# Patient Record
Sex: FEMALE | Race: WHITE | NOT HISPANIC OR LATINO | Employment: FULL TIME | URBAN - METROPOLITAN AREA
[De-identification: names, ages, dates, MRNs, and addresses within clinical notes are randomized per-mention and may not be internally consistent; named-entity substitution may affect disease eponyms.]

---

## 2017-09-20 DIAGNOSIS — O35.9XX1 FETAL ABNORMALITY AFFECTING MANAGEMENT OF MOTHER, FETUS 1: Primary | ICD-10-CM

## 2017-10-04 ENCOUNTER — OFFICE VISIT (OUTPATIENT)
Dept: NEUROSURGERY | Facility: CLINIC | Age: 32
End: 2017-10-04
Payer: COMMERCIAL

## 2017-10-04 ENCOUNTER — OFFICE VISIT (OUTPATIENT)
Dept: MATERNAL FETAL MEDICINE | Facility: CLINIC | Age: 32
End: 2017-10-04
Attending: OBSTETRICS & GYNECOLOGY
Payer: COMMERCIAL

## 2017-10-04 ENCOUNTER — HOSPITAL ENCOUNTER (OUTPATIENT)
Dept: RADIOLOGY | Facility: HOSPITAL | Age: 32
Discharge: HOME | End: 2017-10-04
Attending: OBSTETRICS & GYNECOLOGY
Payer: COMMERCIAL

## 2017-10-04 VITALS
WEIGHT: 168.38 LBS | TEMPERATURE: 98 F | HEART RATE: 91 BPM | SYSTOLIC BLOOD PRESSURE: 111 MMHG | HEIGHT: 67 IN | DIASTOLIC BLOOD PRESSURE: 70 MMHG | BODY MASS INDEX: 26.43 KG/M2

## 2017-10-04 VITALS
BODY MASS INDEX: 26.38 KG/M2 | DIASTOLIC BLOOD PRESSURE: 70 MMHG | SYSTOLIC BLOOD PRESSURE: 111 MMHG | WEIGHT: 168.44 LBS

## 2017-10-04 DIAGNOSIS — O35.09X0 FETAL DANDY-WALKER MALFORMATION AFFECTING CARE OF MOTHER IN SINGLETON PREGNANCY, ANTEPARTUM: Primary | ICD-10-CM

## 2017-10-04 DIAGNOSIS — O35.9XX1 FETAL ABNORMALITY AFFECTING MANAGEMENT OF MOTHER, FETUS 1: ICD-10-CM

## 2017-10-04 DIAGNOSIS — O35.10X0 CHROMOSOMAL ABNORMALITY IN FETUS, AFFECTING MANAGEMENT OF MOTHER, ANTEPARTUM, SINGLE OR UNSPECIFIED FETUS: Primary | ICD-10-CM

## 2017-10-04 PROCEDURE — 99204 OFFICE O/P NEW MOD 45 MIN: CPT | Mod: S$GLB,,, | Performed by: NEUROLOGICAL SURGERY

## 2017-10-04 PROCEDURE — 74712 MRI FETAL SNGL/1ST GESTATION: CPT | Mod: TC

## 2017-10-04 PROCEDURE — 99243 OFF/OP CNSLTJ NEW/EST LOW 30: CPT | Mod: 25,S$GLB,, | Performed by: OBSTETRICS & GYNECOLOGY

## 2017-10-04 PROCEDURE — 76811 OB US DETAILED SNGL FETUS: CPT | Mod: S$GLB,,, | Performed by: OBSTETRICS & GYNECOLOGY

## 2017-10-04 PROCEDURE — 99999 PR PBB SHADOW E&M-EST. PATIENT-LVL III: CPT | Mod: PBBFAC,,, | Performed by: NEUROLOGICAL SURGERY

## 2017-10-04 PROCEDURE — 74712 MRI FETAL SNGL/1ST GESTATION: CPT | Mod: 26,,, | Performed by: RADIOLOGY

## 2017-10-04 PROCEDURE — 99999 PR PBB SHADOW E&M-EST. PATIENT-LVL II: CPT | Mod: PBBFAC,,, | Performed by: OBSTETRICS & GYNECOLOGY

## 2017-10-04 NOTE — LETTER
October 6, 2017      Luiz Sears MD  [address] [facility]  [address] [facility] [address]           [facility]  [address]  [place] [address]  Phone: [phone]          Patient: Amber Aguilar   MR Number: [id number]   YOB: 1985   Date of Visit: 10/4/2017       Dear Dr. Luiz Sears:    Thank you for referring Amber Aguilar to me for evaluation. Attached you will find relevant portions of my assessment and plan of care.    If you have questions, please do not hesitate to call me. I look forward to following Amber Aguilar along with you.    Sincerely,    Robin Hogan MD    Enclosure  CC:  No Recipients    If you would like to receive this communication electronically, please contact [email or web address] or [phone] to request more information on [employer] Link access.    For providers and/or their staff who would like to refer a patient to [facility], please contact us through our one-stop-shop provider referral line, [facility] , at [phone].    If you feel you have received this communication in error or would no longer like to receive these types of communications, please e-mail [email or web address]

## 2017-10-04 NOTE — PROGRESS NOTES
Subjective:    I, Xenia Moore, attest that this documentation has been prepared under the direction and in the presence of TADEO Hogan MD.     Patient ID: Amber Aguilar is a 32 y.o. female.    Chief Complaint: No chief complaint on file.    HPI   Pt is a 32 y.o. female who presents per referral by Dr. Luiz Sears for evaluation of fetal cerebral abnormality. Pt who is 28 gestation with 3rd child. Pt also recently found out that their amniocentesis revealed a chromosomal 6 deletion. Mom denies any family history of congential cerebral abnormalities or neurologic issues with her 2 previous children. Mom has no signs or symptoms of any neurological issues. Non focal.     Review of Systems   Constitutional: Negative for chills, fatigue and fever.   HENT: Negative for sinus pressure and trouble swallowing.    Eyes: Negative.  Negative for visual disturbance.   Respiratory: Negative.    Cardiovascular: Negative.    Gastrointestinal: Negative.  Negative for nausea and vomiting.   Endocrine: Negative.    Genitourinary: Negative.    Musculoskeletal: Negative.    Neurological: Negative for dizziness, seizures, syncope, speech difficulty, weakness and numbness.       Objective:      Physical Exam:  Nursing note and vitals reviewed.    Constitutional: She appears well-developed.     Eyes: Pupils are equal, round, and reactive to light. Conjunctivae and EOM are normal.     Cardiovascular: Normal rate, regular rhythm, normal pulses and intact distal pulses.     Abdominal: Soft.     Psych/Behavior: She is alert. She is oriented to person, place, and time. She has a normal mood and affect.     Musculoskeletal: Gait is normal.        Neck: Range of motion is full. There is no tenderness. Muscle strength is 5/5. Tone is normal.        Back: Range of motion is full. There is no tenderness. Muscle strength is 5/5. Tone is normal.        Right Upper Extremities: Range of motion is full. There is no tenderness. Muscle strength is  5/5. Tone is normal.        Left Upper Extremities: Range of motion is full. There is no tenderness. Muscle strength is 5/5. Tone is normal.       Right Lower Extremities: Range of motion is full. There is no tenderness. Muscle strength is 5/5. Tone is normal.        Left Lower Extremities: Range of motion is full. There is no tenderness. Muscle strength is 5/5. Tone is normal.     Neurological:        Coordination: She has a normal Romberg Test, normal finger to nose coordination, normal heel to shin coordination and normal tandem walking coordination.        DTRs: DTRs are normal. Tricep reflexes are 2+ on the right side and 2+ on the left side. Bicep reflexes are 2+ on the right side and 2+ on the left side. Brachioradialis reflexes are 2+ on the right side and 2+ on the left side. Patellar reflexes are 2+ on the right side and 2+ on the left side. Achilles reflexes are 2+ on the right side and 2+ on the left side.        Cranial nerves: Cranial nerve(s) II, III, IV, V, VI, VII, VIII, IX, X, XI and XII are intact.       Mom has no signs or symptoms of any neurological issues.  Non focal.     Imaging:   I reviewed maternal fetal MRI scan, this has not been read by the radiologist. The resolution is not at the level that I would like, but I am able to see no obvious spinal abnormalities. Intracranially, pt has enlarged ventricles, but its more of a colpocephaly. There is a posterior fossa cyst that looks like it does communicate with the 4th ventricle, but doesn't appear to be grossly enlarged. 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     I, TADEO Hogan MD, personally reviewed the imaging and interpreted  independent of the radiology report.     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 Signs of early hydrocephalous, but I don't see anything that we would intervene at this junction. We would recommend  continuing following pt with US and if the hydrocephalous progresses then we would advocate for C section, but otherwise we will just have to see how the baby does postnatally. We discussed a possible shunt and the implications of this. We discussed the wide range of prognosis at this junction.     I, TADEO Hogan MD, personally performed the services described in this documentation. All medical record entries made by the scribe, Xenia Moore, were at my direction and in my presence.  I have reviewed the chart and agree that the record reflects my personal performance and is accurate and complete.

## 2017-10-06 NOTE — PROGRESS NOTES
"Indication  ========    Consultation. Fetal anatomy survey.    History  ======    Previous Outcomes  Preg. no. 1  Outcome: Live birth  Gender: female  Details:  C/S 8 lb 3 oz  Preg. no. 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 View: Suboptimal view: limited by fetal position.    Pregnancy  =========    Escalante pregnancy. Number of fetuses: 1.    Dating  ======    Cycle: regular cycle  GA by "stated dating" 26 w + 4 d  ANNMARIE by "stated dating": 2018  Ultrasound examination on: 10/4/2017  GA by U/S based upon: AC, BPD, Femur, HC  GA by U/S 26 w + 5 d  ANNMARIE by U/S: 2018  Assigned: Dating performed on 10/4/2017, based on the external assessment  Assigned GA 26 w + 4 d  Assigned ANNMARIE: 2018    General Evaluation  ==============    Cardiac activity: present.  bpm.  Fetal movements: visualized.  Presentation: transverse.  Placenta: posterior.  Umbilical cord: 3 vessel cord, normal.  Amniotic fluid: normal amount.    Fetal Biometry  ============    Fetal Biometry  BPD 66.3 mm 26w 5d Hadlock  OFD 88.7 mm 28w 4d Eleanor  .8 mm 26w 6d Hadlock  .3 mm 27w 3d Hadlock  Femur 46.7 mm 25w 4d Hadlock  Cerebellum tr 29.5 mm 27w 1d Kowalski  CM 12.7 mm  LLV 14.1 mm  RLV 15.9 mm   g 48% Arturo  Calculated by: Hadlock (BPD-HC-AC-FL)  EFW (lb) 2 lb  EFW (oz) 2 oz  Cephalic index 0.75  HC / AC 1.08  FL / BPD 0.70  FL / AC 0.20   bpm  Head / Face / Neck   14.1 mm    Fetal " Anatomy  ===========    Cranium: normal  Lateral ventricles: normal  Choroid plexus: normal  Midline falx: normal  Cavum septi pellucidi: normal  Cerebellum: normal  Cisterna magna: abnormal  Head shape: normal  Rt lateral ventricle: abnormal  Lt lateral ventricle: abnormal  Rt choroid plexus: normal  Lt choroid plexus: normal  Parenchyma: normal  Third ventricle: normal  Fourth ventricle: abnormal  Fourth ventricle: mild dilation  Posterior fossa: normal  Cerebellar lobes: normal  Vermis: abnormal  Vermis: 5.1mm  Neck: appears normal  Lips: suboptimal  Profile: suboptimal  Nose: suboptimal  Maxilla: suboptimal  Mandible: suboptimal  4-chamber view: normal  RVOT: normal  LVOT: normal  Heart / Thorax: Septal views: normal  Situs: normal  Aortic arch: normal  Ductal arch: normal  SVC: normal  IVC: normal  3-vessel view: suboptimal  3-vessel-trachea view: suboptimal  Cardiac position: normal  Cardiac axis: normal  Cardiac size: normal  Cardiac rhythm: normal  Rt lung: normal  Lt lung: normal  Diaphragm: normal  Cord insertion: normal  Stomach: normal  Kidneys: normal  Bladder: normal  Genitals: normal  Abdom. wall: appears normal  Abdom. cavity: normal  Rt kidney: normal  Lt kidney: normal  Liver: normal  Bowel: normal  Cervical spine: normal  Thoracic spine: normal  Lumbar spine: normal  Sacral spine: normal  Arms: normal  Legs: normal  Rt arm: normal  Lt arm: normal  Rt hand: present  Lt hand: present  Rt leg: normal  Lt leg: normal  Rt foot: abnormal  Rt foot: clubbing  Lt foot: normal  Position of hands: normal  Gender: female  Wants to know gender: yes    Maternal Structures  ===============    Uterus / Cervix  Uterus: Normal  Ovaries / Tubes / Adnexa  Rt ovary: Visualized  Lt ovary: Visualized  Other: Uterus and adnexa normal    Consultation  ==========    Consulting physician: Dr. Toni Reid    Reason for consultation: Fetal chromosome abnormality and fetal posterior fossa abnormality for discussion of  delivery planning    Sapna is a 32-year-old para 2002 at 26 weeks and 4 days gestation whom Dr. Reid  saw in consultation from Dr. Miranda due to an abnormal serum screen. 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 Parental testing revealed this to be de lula.    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 She met with Dr. Hogan who counseled her regarding these findings as  well.    We performed a targeted ultrasound evaluation today and the findings are detailed on this report. Notably in assessing the intracranial anatomy  we saw that there was mild lateral ventriculomegaly with measurements ranging from 12-14 mm in maximal dimension. There did appear to be  some colpocephaly with prominence of the posterior horns. The third ventricle appeared dilated and that cavum septum pellucidum appeared  somewhat widened. The fourth ventricle was visible suggesting mild dilation. In the posterior fossa there is a cystic structure posterior to the  cerebellum. The upper portion of the cerebellar vermis appears intact but inferiorly I feel like we can see splaying of the cerebellum with inferior  vermian agenesis suggesting a Dandy-Walker variant. In the lower extremities the right foot appeared clubbed. The left foot appeared normal.  The cardiac anatomy overall appeared normal But views were somewhat limited by the position. Views of the fetal face were limited as the  fetus was in a prone position.    I overall spent approximately 40 minutes in face-to-face time with Sapna and her  greater than 50% of  which were spent in counseling  time regarding today's findings and the micro-array result as well as in discussion of further pregnancy management and plans of care. I  discussed the micro-array result and the significance of a deletion of the terminal portion of chromosome 6. 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 This involves a number of genes that are  developmentally important especially for structures in the face, heart, eyes, brain, and ears. A phenotype for the 6p terminal deletion syndrome  has been described and includes craniofacial anomalies, eye anomalies, hearing loss, brain abnormalities such as Dandy-Walker, foot  anomaly, cognitive impairment and other associated problems. However, the phenotype is highly variable and depends on the size of the  deletion. In this case, the deletion was not visible on karyotype, but only on array, so the phenotype may not be as severe as the deletion is  smaller than that described in the literature with visible deletions.    We discussed the significance of the CNS anomalies that are noted on imaging. I explained that at this point we see mild to moderate  ventriculomegaly but that we cannot predict what will happen over the course of the remainder pregnancy. I suggested to them that as the  pregnancy progresses that this be followed by Dr. Nova and if there gets to be more progressive ventriculomegaly then the likelihood of the  need for decompression in the immediate  period increases. In the literature, many David pouch cysts actually decrease in size as  pregnancy progresses and if that is the case that may further decrease the likelihood of needing decompression. Theyre trying to decide  where to deliver and I explained that ultimately I do not think that she has to deliver here in [place] as I would not anticipate that the infant  will need immediate  neurosurgical intervention, but it is possible that some time in the  period that some type of neurosurgical  intervention could be required. I do think that it would be reasonable for her if she strongly desired to deliver in [place] since they have a   intensive care unit there with the understanding that it is always a possibility that the infant could require transfer to a center with  neurosurgical capabilities.    I explained to them that at this point while we can see the CNS lesions and the clubfoot which is likely related to the degree of neurologic  lesion, the deletion will be more important in the overall prognostic determination for this infant than consideration of the structural problems  alone. Postnatally the infant will need a comprehensive evaluation including an echocardiogram, an ultrasound of the abdomen and pelvis with a  special focus on the kidneys, head MRI, ophthalmology consult, and hearing evaluation. The infant will also need to be seen postnatally by a  pediatric geneticist.    Im going to send Sapna some information regarding the 6p deletion syndrome and I am also going to try to get in contact with a genetic  counselor from [place] to see if I can get some information with regard to the regions of homozygosity noted on the array. I  discussed how areas of homozygosity and be clinically significant because of the potential for autosomal recessive disease, but given their 2  other healthy children I tried to provide some degree of reassurance on this. I also explained that it is likely that the degree of relatedness is  quite distant given the way it is described in the report.    I have not scheduled a follow-up appointment for Sapna here in [place], but I would certainly be happy to see her again if we needed to  make arrangements for her to deliver here. If any issues or problems arise, please do not hesitate to call me. Thank you for allowing  me to  participate in her care.      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  Fetal size is appropriate for EGA.  Placental location is normal without evidence of previa.    Recommendation  ==============    1. Follow serial ultrasound for growth and re-evaluation of the intracranial anatomy. If develops progressive ventriculomegaly, more likely to need  decompression and would consider delivery at a center with pediatric neurosurgical capabilities.  2.  comprehensive evaluation of  and referral to pediatric genetics.    Thank you again for allowing us to participate in the care of your patients. If you have any questions concerning today's consultation, feel free  to contact me or one of my partners. We can be reached at [phone] during normal business hours. If you have a question after normal  business hours, please contact Labor and Delivery at [phone].

## 2017-10-09 ENCOUNTER — PATIENT MESSAGE (OUTPATIENT)
Dept: MATERNAL FETAL MEDICINE | Facility: OTHER | Age: 32
End: 2017-10-09

## 2017-11-29 ENCOUNTER — TELEPHONE (OUTPATIENT)
Dept: MATERNAL FETAL MEDICINE | Facility: CLINIC | Age: 32
End: 2017-11-29

## 2017-11-29 NOTE — TELEPHONE ENCOUNTER
Unable to leave patient a message.      ----- Message from Adeline Thomas sent at 11/29/2017 12:43 PM CST -----  Contact: Self  Pt is calling with some questions. Please call back at [phone]. sh

## 2017-11-30 ENCOUNTER — TELEPHONE (OUTPATIENT)
Dept: MATERNAL FETAL MEDICINE | Facility: CLINIC | Age: 32
End: 2017-11-30

## 2017-11-30 NOTE — TELEPHONE ENCOUNTER
Pt calling [facility] clinic regarding her possible delivery here at [facility]. Fetus with Dandy Walker Variant and is concerned about which facility would be best to deliver at.    Informed pt that I would discuss with Dr. Sears and call her back to clarify.    Pt verbalized understanding of information.

## 2017-12-26 ENCOUNTER — TELEPHONE (OUTPATIENT)
Dept: MATERNAL FETAL MEDICINE | Facility: CLINIC | Age: 32
End: 2017-12-26

## 2017-12-26 NOTE — TELEPHONE ENCOUNTER
----- Message from Adeline Thomas sent at 12/26/2017  1:15 PM CST -----  Contact: self  p t is calling about her delivery next Tuesday. Pt can be reached at [phone]. Sh    Returned call to patient and she has decided to deliver at [facility]. C/S had been scheduled on 1/2/18 at 7am. Pt will check in on 1/1/18 at 8pm.    Pt verbalized understanding of information.

## 2017-12-27 ENCOUNTER — TELEPHONE (OUTPATIENT)
Dept: MATERNAL FETAL MEDICINE | Facility: CLINIC | Age: 32
End: 2017-12-27

## 2017-12-27 NOTE — TELEPHONE ENCOUNTER
Contacted pt regarding lodging prior to scheduled . Pt informed that insurance may not pay for her admit on the night prior to delivery and suggested [facility] near the hospital. Pt informed that she would be responsible for approximately $90 out of pocket. Patient agreed to this payment and will coordinate with [facility] ().    Pt also requesting BTL at the time of delivery and patient informed that this was a possibility and that she would be consented by the MD prior to surgery.    Pt verbalized understanding of information.

## 2017-12-28 ENCOUNTER — PATIENT MESSAGE (OUTPATIENT)
Dept: MATERNAL FETAL MEDICINE | Facility: CLINIC | Age: 32
End: 2017-12-28

## 2018-01-02 ENCOUNTER — ANESTHESIA EVENT (OUTPATIENT)
Dept: OBSTETRICS AND GYNECOLOGY | Facility: OTHER | Age: 33
End: 2018-01-02
Payer: COMMERCIAL

## 2018-01-02 ENCOUNTER — HOSPITAL ENCOUNTER (INPATIENT)
Facility: OTHER | Age: 33
LOS: 3 days | Discharge: HOME | End: 2018-01-05
Attending: OBSTETRICS & GYNECOLOGY | Admitting: OBSTETRICS & GYNECOLOGY
Payer: COMMERCIAL

## 2018-01-02 ENCOUNTER — SURGERY (OUTPATIENT)
Age: 33
End: 2018-01-02

## 2018-01-02 ENCOUNTER — ANESTHESIA (OUTPATIENT)
Dept: OBSTETRICS AND GYNECOLOGY | Facility: OTHER | Age: 33
End: 2018-01-02
Payer: COMMERCIAL

## 2018-01-02 DIAGNOSIS — Z98.891 STATUS POST REPEAT LOW TRANSVERSE CESAREAN SECTION: Primary | ICD-10-CM

## 2018-01-02 DIAGNOSIS — O34.219: ICD-10-CM

## 2018-01-02 DIAGNOSIS — O35.10X0: ICD-10-CM

## 2018-01-02 DIAGNOSIS — O35.09X0 FETAL DANDY-WALKER MALFORMATION AFFECTING CARE OF MOTHER, ANTEPARTUM, SINGLE OR UNSPECIFIED FETUS: ICD-10-CM

## 2018-01-02 PROBLEM — O35.09X1: Status: ACTIVE | Noted: 2018-01-02

## 2018-01-02 LAB
ABO + RH BLD: NORMAL
ABO + RH BLD: NORMAL
ALLENS TEST: ABNORMAL
BASOPHILS # BLD AUTO: 0.02 K/UL
BASOPHILS NFR BLD: 0.2 %
BLD GP AB SCN CELLS X3 SERPL QL: NORMAL
BLD GP AB SCN CELLS X3 SERPL QL: NORMAL
DIFFERENTIAL METHOD: ABNORMAL
EOSINOPHIL # BLD AUTO: 0 K/UL
EOSINOPHIL NFR BLD: 0.4 %
ERYTHROCYTE [DISTWIDTH] IN BLOOD BY AUTOMATED COUNT: 13.2 %
FETAL CELL SCN BLD QL ROSETTE: NORMAL
HBV SURFACE AG SERPL QL IA: NEGATIVE
HCO3 UR-SCNC: 25.1 MMOL/L (ref 24–28)
HCT VFR BLD AUTO: 37.6 %
HGB BLD-MCNC: 12.7 G/DL
HIV 1+2 AB+HIV1 P24 AG SERPL QL IA: NEGATIVE
HIV1+2 IGG SERPL QL IA.RAPID: NEGATIVE
LYMPHOCYTES # BLD AUTO: 1.7 K/UL
LYMPHOCYTES NFR BLD: 20.4 %
MCH RBC QN AUTO: 33.2 PG
MCHC RBC AUTO-ENTMCNC: 33.8 G/DL
MCV RBC AUTO: 98 FL
MONOCYTES # BLD AUTO: 0.6 K/UL
MONOCYTES NFR BLD: 7.6 %
NEUTROPHILS # BLD AUTO: 6 K/UL
NEUTROPHILS NFR BLD: 70.9 %
PCO2 BLDA: 51.6 MMHG (ref 35–45)
PH SMN: 7.3 [PH] (ref 7.35–7.45)
PLATELET # BLD AUTO: 200 K/UL
PMV BLD AUTO: 10.8 FL
PO2 BLDA: 11 MMHG (ref 80–100)
POC BE: -1 MMOL/L
POC SATURATED O2: 9 % (ref 95–100)
RBC # BLD AUTO: 3.82 M/UL
RH BLD: NORMAL
RPR SER QL: NORMAL
SAMPLE: ABNORMAL
SITE: ABNORMAL
WBC # BLD AUTO: 8.46 K/UL

## 2018-01-02 PROCEDURE — 63600175 PHARM REV CODE 636 W HCPCS: Performed by: STUDENT IN AN ORGANIZED HEALTH CARE EDUCATION/TRAINING PROGRAM

## 2018-01-02 PROCEDURE — 25000003 PHARM REV CODE 250: Performed by: STUDENT IN AN ORGANIZED HEALTH CARE EDUCATION/TRAINING PROGRAM

## 2018-01-02 PROCEDURE — 87340 HEPATITIS B SURFACE AG IA: CPT

## 2018-01-02 PROCEDURE — 59515 CESAREAN DELIVERY: CPT | Mod: ,,, | Performed by: OBSTETRICS & GYNECOLOGY

## 2018-01-02 PROCEDURE — 86703 HIV-1/HIV-2 1 RESULT ANTBDY: CPT

## 2018-01-02 PROCEDURE — 37000008 HC ANESTHESIA 1ST 15 MINUTES: Performed by: OBSTETRICS & GYNECOLOGY

## 2018-01-02 PROCEDURE — 51702 INSERT TEMP BLADDER CATH: CPT

## 2018-01-02 PROCEDURE — 86703 HIV-1/HIV-2 1 RESULT ANTBDY: CPT | Mod: 91

## 2018-01-02 PROCEDURE — 36415 COLL VENOUS BLD VENIPUNCTURE: CPT

## 2018-01-02 PROCEDURE — 86850 RBC ANTIBODY SCREEN: CPT | Mod: 91

## 2018-01-02 PROCEDURE — 88302 TISSUE EXAM BY PATHOLOGIST: CPT | Performed by: PATHOLOGY

## 2018-01-02 PROCEDURE — 59515 CESAREAN DELIVERY: CPT | Mod: ,,, | Performed by: ANESTHESIOLOGY

## 2018-01-02 PROCEDURE — 85025 COMPLETE CBC W/AUTO DIFF WBC: CPT

## 2018-01-02 PROCEDURE — 86850 RBC ANTIBODY SCREEN: CPT

## 2018-01-02 PROCEDURE — 85461 HEMOGLOBIN FETAL: CPT

## 2018-01-02 PROCEDURE — 11000001 HC ACUTE MED/SURG PRIVATE ROOM

## 2018-01-02 PROCEDURE — 0UB70ZZ EXCISION OF BILATERAL FALLOPIAN TUBES, OPEN APPROACH: ICD-10-PCS | Performed by: OBSTETRICS & GYNECOLOGY

## 2018-01-02 PROCEDURE — 88302 TISSUE EXAM BY PATHOLOGIST: CPT | Mod: 26,,, | Performed by: PATHOLOGY

## 2018-01-02 PROCEDURE — 36000680 HC C/S TUBAL LIGATION LEVEL I

## 2018-01-02 PROCEDURE — 82803 BLOOD GASES ANY COMBINATION: CPT

## 2018-01-02 PROCEDURE — 86592 SYPHILIS TEST NON-TREP QUAL: CPT

## 2018-01-02 PROCEDURE — 86901 BLOOD TYPING SEROLOGIC RH(D): CPT

## 2018-01-02 PROCEDURE — S0028 INJECTION, FAMOTIDINE, 20 MG: HCPCS | Performed by: STUDENT IN AN ORGANIZED HEALTH CARE EDUCATION/TRAINING PROGRAM

## 2018-01-02 PROCEDURE — 99900035 HC TECH TIME PER 15 MIN (STAT)

## 2018-01-02 PROCEDURE — 37000009 HC ANESTHESIA EA ADD 15 MINS: Performed by: OBSTETRICS & GYNECOLOGY

## 2018-01-02 PROCEDURE — 86762 RUBELLA ANTIBODY: CPT

## 2018-01-02 PROCEDURE — 58611 LIGATE OVIDUCT(S) ADD-ON: CPT | Mod: ,,, | Performed by: OBSTETRICS & GYNECOLOGY

## 2018-01-02 RX ORDER — MUPIROCIN 20 MG/G
1 OINTMENT TOPICAL 2 TIMES DAILY
Status: DISCONTINUED | OUTPATIENT
Start: 2018-01-02 | End: 2018-01-05 | Stop reason: HOSPADM

## 2018-01-02 RX ORDER — ONDANSETRON 2 MG/ML
4 INJECTION INTRAMUSCULAR; INTRAVENOUS EVERY 8 HOURS PRN
Status: DISCONTINUED | OUTPATIENT
Start: 2018-01-02 | End: 2018-01-05 | Stop reason: HOSPADM

## 2018-01-02 RX ORDER — IBUPROFEN 600 MG/1
600 TABLET ORAL EVERY 6 HOURS
Status: DISCONTINUED | OUTPATIENT
Start: 2018-01-03 | End: 2018-01-02

## 2018-01-02 RX ORDER — BUPIVACAINE HYDROCHLORIDE 7.5 MG/ML
INJECTION, SOLUTION INTRASPINAL
Status: DISCONTINUED | OUTPATIENT
Start: 2018-01-02 | End: 2018-01-02

## 2018-01-02 RX ORDER — KETOROLAC TROMETHAMINE 30 MG/ML
INJECTION, SOLUTION INTRAMUSCULAR; INTRAVENOUS
Status: DISCONTINUED | OUTPATIENT
Start: 2018-01-02 | End: 2018-01-02

## 2018-01-02 RX ORDER — OXYCODONE HYDROCHLORIDE 5 MG/1
5 TABLET ORAL EVERY 4 HOURS PRN
Status: DISCONTINUED | OUTPATIENT
Start: 2018-01-02 | End: 2018-01-05 | Stop reason: HOSPADM

## 2018-01-02 RX ORDER — OXYTOCIN/RINGER'S LACTATE 20/1000 ML
41.65 PLASTIC BAG, INJECTION (ML) INTRAVENOUS CONTINUOUS
Status: ACTIVE | OUTPATIENT
Start: 2018-01-02 | End: 2018-01-02

## 2018-01-02 RX ORDER — ACETAMINOPHEN 10 MG/ML
INJECTION, SOLUTION INTRAVENOUS
Status: DISCONTINUED | OUTPATIENT
Start: 2018-01-02 | End: 2018-01-02

## 2018-01-02 RX ORDER — DIPHENHYDRAMINE HCL 25 MG
25 CAPSULE ORAL EVERY 4 HOURS PRN
Status: DISCONTINUED | OUTPATIENT
Start: 2018-01-02 | End: 2018-01-05 | Stop reason: HOSPADM

## 2018-01-02 RX ORDER — SODIUM CITRATE AND CITRIC ACID MONOHYDRATE 334; 500 MG/5ML; MG/5ML
30 SOLUTION ORAL
Status: DISCONTINUED | OUTPATIENT
Start: 2018-01-02 | End: 2018-01-05 | Stop reason: HOSPADM

## 2018-01-02 RX ORDER — HYDROCODONE BITARTRATE AND ACETAMINOPHEN 10; 325 MG/1; MG/1
1 TABLET ORAL EVERY 4 HOURS PRN
Status: DISCONTINUED | OUTPATIENT
Start: 2018-01-03 | End: 2018-01-05 | Stop reason: HOSPADM

## 2018-01-02 RX ORDER — MUPIROCIN 20 MG/G
OINTMENT TOPICAL
Status: DISCONTINUED | OUTPATIENT
Start: 2018-01-02 | End: 2018-01-02

## 2018-01-02 RX ORDER — MISOPROSTOL 200 UG/1
800 TABLET ORAL
Status: DISCONTINUED | OUTPATIENT
Start: 2018-01-02 | End: 2018-01-05 | Stop reason: HOSPADM

## 2018-01-02 RX ORDER — BISACODYL 10 MG
10 SUPPOSITORY, RECTAL RECTAL ONCE AS NEEDED
Status: ACTIVE | OUTPATIENT
Start: 2018-01-02 | End: 2018-01-02

## 2018-01-02 RX ORDER — OXYTOCIN 10 [USP'U]/ML
INJECTION, SOLUTION INTRAMUSCULAR; INTRAVENOUS
Status: DISCONTINUED | OUTPATIENT
Start: 2018-01-02 | End: 2018-01-02

## 2018-01-02 RX ORDER — HYDROCORTISONE 25 MG/G
CREAM TOPICAL 3 TIMES DAILY PRN
Status: DISCONTINUED | OUTPATIENT
Start: 2018-01-02 | End: 2018-01-05 | Stop reason: HOSPADM

## 2018-01-02 RX ORDER — AMOXICILLIN 250 MG
1 CAPSULE ORAL NIGHTLY PRN
Status: DISCONTINUED | OUTPATIENT
Start: 2018-01-02 | End: 2018-01-05 | Stop reason: HOSPADM

## 2018-01-02 RX ORDER — PROCHLORPERAZINE MALEATE 5 MG
5 TABLET ORAL 3 TIMES DAILY PRN
Status: DISCONTINUED | OUTPATIENT
Start: 2018-01-02 | End: 2018-01-05 | Stop reason: HOSPADM

## 2018-01-02 RX ORDER — MORPHINE SULFATE 0.5 MG/ML
INJECTION, SOLUTION EPIDURAL; INTRATHECAL; INTRAVENOUS
Status: COMPLETED
Start: 2018-01-02 | End: 2018-01-02

## 2018-01-02 RX ORDER — FENTANYL CITRATE 50 UG/ML
INJECTION, SOLUTION INTRAMUSCULAR; INTRAVENOUS
Status: DISCONTINUED | OUTPATIENT
Start: 2018-01-02 | End: 2018-01-02

## 2018-01-02 RX ORDER — ONDANSETRON 8 MG/1
8 TABLET, ORALLY DISINTEGRATING ORAL EVERY 8 HOURS PRN
Status: DISCONTINUED | OUTPATIENT
Start: 2018-01-02 | End: 2018-01-05 | Stop reason: HOSPADM

## 2018-01-02 RX ORDER — SODIUM CHLORIDE, SODIUM LACTATE, POTASSIUM CHLORIDE, CALCIUM CHLORIDE 600; 310; 30; 20 MG/100ML; MG/100ML; MG/100ML; MG/100ML
INJECTION, SOLUTION INTRAVENOUS CONTINUOUS
Status: DISCONTINUED | OUTPATIENT
Start: 2018-01-02 | End: 2018-01-05 | Stop reason: HOSPADM

## 2018-01-02 RX ORDER — CEFAZOLIN SODIUM 2 G/50ML
2 SOLUTION INTRAVENOUS
Status: DISCONTINUED | OUTPATIENT
Start: 2018-01-02 | End: 2018-01-05 | Stop reason: HOSPADM

## 2018-01-02 RX ORDER — IBUPROFEN 600 MG/1
600 TABLET ORAL EVERY 6 HOURS
Status: DISCONTINUED | OUTPATIENT
Start: 2018-01-04 | End: 2018-01-05 | Stop reason: HOSPADM

## 2018-01-02 RX ORDER — PHENYLEPHRINE HYDROCHLORIDE 10 MG/ML
INJECTION INTRAVENOUS
Status: DISCONTINUED | OUTPATIENT
Start: 2018-01-02 | End: 2018-01-02

## 2018-01-02 RX ORDER — SODIUM CHLORIDE, SODIUM LACTATE, POTASSIUM CHLORIDE, CALCIUM CHLORIDE 600; 310; 30; 20 MG/100ML; MG/100ML; MG/100ML; MG/100ML
INJECTION, SOLUTION INTRAVENOUS CONTINUOUS PRN
Status: DISCONTINUED | OUTPATIENT
Start: 2018-01-02 | End: 2018-01-02

## 2018-01-02 RX ORDER — METHYLERGONOVINE MALEATE 0.2 MG/ML
200 INJECTION INTRAVENOUS
Status: DISCONTINUED | OUTPATIENT
Start: 2018-01-02 | End: 2018-01-05 | Stop reason: HOSPADM

## 2018-01-02 RX ORDER — OXYCODONE HYDROCHLORIDE 5 MG/1
10 TABLET ORAL EVERY 4 HOURS PRN
Status: DISCONTINUED | OUTPATIENT
Start: 2018-01-02 | End: 2018-01-05 | Stop reason: HOSPADM

## 2018-01-02 RX ORDER — MORPHINE SULFATE 0.5 MG/ML
INJECTION, SOLUTION EPIDURAL; INTRATHECAL; INTRAVENOUS
Status: DISCONTINUED | OUTPATIENT
Start: 2018-01-02 | End: 2018-01-02

## 2018-01-02 RX ORDER — SIMETHICONE 80 MG
1 TABLET,CHEWABLE ORAL EVERY 6 HOURS PRN
Status: DISCONTINUED | OUTPATIENT
Start: 2018-01-02 | End: 2018-01-05 | Stop reason: HOSPADM

## 2018-01-02 RX ORDER — DOCUSATE SODIUM 100 MG/1
200 CAPSULE, LIQUID FILLED ORAL 2 TIMES DAILY
Status: DISCONTINUED | OUTPATIENT
Start: 2018-01-02 | End: 2018-01-05 | Stop reason: HOSPADM

## 2018-01-02 RX ORDER — ADHESIVE BANDAGE
30 BANDAGE TOPICAL 2 TIMES DAILY PRN
Status: DISCONTINUED | OUTPATIENT
Start: 2018-01-03 | End: 2018-01-05 | Stop reason: HOSPADM

## 2018-01-02 RX ORDER — FAMOTIDINE 10 MG/ML
20 INJECTION INTRAVENOUS
Status: DISCONTINUED | OUTPATIENT
Start: 2018-01-02 | End: 2018-01-02

## 2018-01-02 RX ORDER — CARBOPROST TROMETHAMINE 250 UG/ML
250 INJECTION, SOLUTION INTRAMUSCULAR
Status: DISCONTINUED | OUTPATIENT
Start: 2018-01-02 | End: 2018-01-05 | Stop reason: HOSPADM

## 2018-01-02 RX ORDER — HYDROCODONE BITARTRATE AND ACETAMINOPHEN 5; 325 MG/1; MG/1
1 TABLET ORAL EVERY 4 HOURS PRN
Status: DISCONTINUED | OUTPATIENT
Start: 2018-01-03 | End: 2018-01-05 | Stop reason: HOSPADM

## 2018-01-02 RX ORDER — KETOROLAC TROMETHAMINE 30 MG/ML
15 INJECTION, SOLUTION INTRAMUSCULAR; INTRAVENOUS EVERY 6 HOURS
Status: DISCONTINUED | OUTPATIENT
Start: 2018-01-02 | End: 2018-01-03

## 2018-01-02 RX ORDER — ONDANSETRON 2 MG/ML
INJECTION INTRAMUSCULAR; INTRAVENOUS
Status: DISCONTINUED | OUTPATIENT
Start: 2018-01-02 | End: 2018-01-02

## 2018-01-02 RX ORDER — SODIUM CHLORIDE, SODIUM LACTATE, POTASSIUM CHLORIDE, CALCIUM CHLORIDE 600; 310; 30; 20 MG/100ML; MG/100ML; MG/100ML; MG/100ML
INJECTION, SOLUTION INTRAVENOUS CONTINUOUS
Status: ACTIVE | OUTPATIENT
Start: 2018-01-02 | End: 2018-01-02

## 2018-01-02 RX ADMIN — OXYTOCIN 3 UNITS: 10 INJECTION, SOLUTION INTRAMUSCULAR; INTRAVENOUS at 07:01

## 2018-01-02 RX ADMIN — PHENYLEPHRINE HYDROCHLORIDE 100 MCG: 10 INJECTION INTRAVENOUS at 07:01

## 2018-01-02 RX ADMIN — SODIUM CHLORIDE, SODIUM LACTATE, POTASSIUM CHLORIDE, AND CALCIUM CHLORIDE: 600; 310; 30; 20 INJECTION, SOLUTION INTRAVENOUS at 06:01

## 2018-01-02 RX ADMIN — KETOROLAC TROMETHAMINE 15 MG: 30 INJECTION, SOLUTION INTRAMUSCULAR at 06:01

## 2018-01-02 RX ADMIN — DOCUSATE SODIUM 200 MG: 100 CAPSULE, LIQUID FILLED ORAL at 09:01

## 2018-01-02 RX ADMIN — FENTANYL CITRATE 10 MCG: 50 INJECTION, SOLUTION INTRAMUSCULAR; INTRAVENOUS at 07:01

## 2018-01-02 RX ADMIN — BUPIVACAINE HYDROCHLORIDE IN DEXTROSE 1.6 ML: 7.5 INJECTION, SOLUTION SUBARACHNOID at 07:01

## 2018-01-02 RX ADMIN — OXYTOCIN 2 UNITS: 10 INJECTION, SOLUTION INTRAMUSCULAR; INTRAVENOUS at 08:01

## 2018-01-02 RX ADMIN — KETOROLAC TROMETHAMINE 15 MG: 30 INJECTION, SOLUTION INTRAMUSCULAR at 12:01

## 2018-01-02 RX ADMIN — OXYTOCIN 2 UNITS: 10 INJECTION, SOLUTION INTRAMUSCULAR; INTRAVENOUS at 07:01

## 2018-01-02 RX ADMIN — DEXTROSE 2 G: 50 INJECTION, SOLUTION INTRAVENOUS at 07:01

## 2018-01-02 RX ADMIN — DOCUSATE SODIUM 200 MG: 100 CAPSULE, LIQUID FILLED ORAL at 10:01

## 2018-01-02 RX ADMIN — FAMOTIDINE 20 MG: 10 INJECTION, SOLUTION INTRAVENOUS at 06:01

## 2018-01-02 RX ADMIN — SODIUM CITRATE AND CITRIC ACID MONOHYDRATE 30 ML: 500; 334 SOLUTION ORAL at 06:01

## 2018-01-02 RX ADMIN — ONDANSETRON 4 MG: 2 INJECTION INTRAMUSCULAR; INTRAVENOUS at 07:01

## 2018-01-02 RX ADMIN — SODIUM CHLORIDE, SODIUM LACTATE, POTASSIUM CHLORIDE, AND CALCIUM CHLORIDE: 600; 310; 30; 20 INJECTION, SOLUTION INTRAVENOUS at 05:01

## 2018-01-02 RX ADMIN — KETOROLAC TROMETHAMINE 15 MG: 30 INJECTION, SOLUTION INTRAMUSCULAR at 11:01

## 2018-01-02 RX ADMIN — ACETAMINOPHEN 1000 MG: 10 INJECTION, SOLUTION INTRAVENOUS at 07:01

## 2018-01-02 RX ADMIN — OXYCODONE HYDROCHLORIDE 5 MG: 5 TABLET ORAL at 10:01

## 2018-01-02 RX ADMIN — DIPHENHYDRAMINE HYDROCHLORIDE 25 MG: 25 CAPSULE ORAL at 06:01

## 2018-01-02 RX ADMIN — SODIUM CHLORIDE, SODIUM LACTATE, POTASSIUM CHLORIDE, AND CALCIUM CHLORIDE: 600; 310; 30; 20 INJECTION, SOLUTION INTRAVENOUS at 08:01

## 2018-01-02 RX ADMIN — KETOROLAC TROMETHAMINE 30 MG: 30 INJECTION, SOLUTION INTRAMUSCULAR; INTRAVENOUS at 07:01

## 2018-01-02 RX ADMIN — Medication 0.15 MG: at 07:01

## 2018-01-02 NOTE — H&P
[facility]  Obstetrics  History & Physical    Patient Name: Amber Aguilar  MRN: [id number]  Admission Date: 2018  Primary Care Provider: Toni Reid MD    Subjective:     Principal Problem:Labor and delivery, indication for care    History of Present Illness:  Amber Aguilar is a 32 y.o. [id number] at 39w3d presents for scheduled RLTCS and BTL.    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  Patient denies contractions, denies vaginal bleeding, denies LOF.   Fetal Movement: normal.       Obstetric HPI:    Obstetric History       T2      L2     SAB0   TAB0   Ectopic0   Multiple0   Live Births2       # Outcome Date GA Lbr Jorge/2nd Weight Sex Delivery Anes PTL Lv   3 Current            2 Term            1 Term                 No past medical history on file.  Past Surgical History:   Procedure Laterality Date    ADENOIDECTOMY       SECTION      x2    TONSILLECTOMY         PTA Medications   Medication Sig    PRENATAL 25/IRON FUM/FOLIC/DHA (PRENATAL-1 ORAL) Take by mouth.       Review of patient's allergies indicates:  No Known Allergies     Family History     None        Social History Main Topics    Smoking status: Never Smoker    Smokeless tobacco: Never Used    Alcohol use No    Drug use: No    Sexual activity: Yes     Partners: Male     Review of Systems   Constitutional: Negative for fever.   Respiratory: Negative for cough and shortness of breath.    Cardiovascular: Negative for leg swelling.   Gastrointestinal: Negative for abdominal pain (contractions) and vomiting.   Genitourinary: Negative for vaginal bleeding and vaginal discharge.   Neurological: Negative for headaches.   Hematological: Does not bruise/bleed easily.      Objective:     Vital Signs (Most Recent):    Vital Signs (24h Range):           There is no height or weight on file to calculate BMI.    FHT: 130 Cat 1 (reassuring)  TOCO:  irregular     Physical Exam:   Constitutional: She is oriented to person, place, and time. She appears well-developed and well-nourished. No distress.    HENT:   Head: Normocephalic and atraumatic.      Cardiovascular: Normal rate and regular rhythm.     Pulmonary/Chest: Effort normal.        Abdominal: Soft. She exhibits no distension. There is no tenderness.   Gravid               Musculoskeletal: Normal range of motion. She exhibits no edema.       Neurological: She is alert and oriented to person, place, and time.    Skin: Skin is warm and dry.    Psychiatric: She has a normal mood and affect.       Cervix:  Dilation:  def  Presentation: Vertex     Significant Labs:  No results found for: GROUPTRH, HEPBSAG, RUBELLAIGGSC, STREPBCULT, AFP, OKEHAHW6JX    CBC:   Recent Labs  Lab 18  0605   WBC 8.46   RBC 3.82*   HGB 12.7   HCT 37.6      MCV 98   MCH 33.2*   MCHC 33.8     I have personallly reviewed all pertinent lab results from the last 24 hours.    Assessment/Plan:     32 y.o. female  at 39w3d for:    * Labor and delivery, indication for care    - Consents signed and to chart  - BTL consents signed   - Admit to Labor and Delivery unit  - Epidural per Anesthesia  - Draw CBC, T&S  - Ancef OCTOR  - To OR for C/S. Case Request is in.   - Notify Staff  - Ultrasound performed, infant in vertex position.   - Post-Partum Hemorrhage risk - low                  Marisela Sims MD  Obstetrics  [facility]

## 2018-01-02 NOTE — ANESTHESIA PREPROCEDURE EVALUATION
2018     Amber Aguilar is a 32 y.o. female  with a lazar pregnancy with dandy walker syndrome who presents for a scheduled . This is her 3rd . She denies any complications with her previous pregnancies. She has no allergies, spinal disorders, or bleeding disorders. She has been NPO since 8PM on 17.     OB History    Para Term  AB Living   3 2 2 0 0 2   SAB TAB Ectopic Multiple Live Births   0 0 0 0 2      # Outcome Date GA Lbr Jorge/2nd Weight Sex Delivery Anes PTL Lv   3 Current            2 Term            1 Term                   Wt Readings from Last 1 Encounters:   18 0530 76.4 kg (168 lb 6.9 oz)       BP Readings from Last 3 Encounters:   10/04/17 111/70   10/04/17 111/70       Patient Active Problem List   Diagnosis    Labor and delivery, indication for care       Past Surgical History:   Procedure Laterality Date    ADENOIDECTOMY       SECTION      x2    TONSILLECTOMY         Social History     Social History    Marital status:      Spouse name: N/A    Number of children: N/A    Years of education: N/A     Occupational History    Not on file.     Social History Main Topics    Smoking status: Never Smoker    Smokeless tobacco: Never Used    Alcohol use No    Drug use: No    Sexual activity: Yes     Partners: Male     Other Topics Concern    Not on file     Social History Narrative    No narrative on file         Chemistry    No results found for: NA, K, CL, CO2, BUN, CREATININE, GLU No results found for: CALCIUM, ALKPHOS, AST, ALT, BILITOT, ESTGFRAFRICA, EGFRNONAA         Lab Results   Component Value Date    WBC 8.46 2018    HGB 12.7 2018    HCT 37.6 2018    MCV 98 2018     2018       No results for input(s): PT, INR, PROTIME, APTT in the last 72 hours.                Pre-op  Assessment    I have reviewed the Patient Summary Reports.         Review of Systems  Anesthesia Hx:  No problems with previous Anesthesia  History of prior surgery of interest to airway management or planning: Denies Family Hx of Anesthesia complications.   Denies Personal Hx of Anesthesia complications.   Social:  Non-Smoker    Cardiovascular:  Cardiovascular Normal     Pulmonary:  Pulmonary Normal    Renal/:  Renal/ Normal     Hepatic/GI:  Hepatic/GI Normal    Neurological:  Neurology Normal    Endocrine:  Endocrine Normal        Physical Exam  General:  Well nourished    Airway/Jaw/Neck:  Airway Findings: Mouth Opening: Normal Tongue: Normal  General Airway Assessment: Adult  Mallampati: II  Improves to I with phonation.  TM Distance: Normal, at least 6 cm      Dental:  DENTAL FINDINGS: Normal   Chest/Lungs:  Chest/Lungs Clear    Heart/Vascular:  Heart Findings: Normal Heart murmur: negative       Mental Status:  Mental Status Findings: Normal        Anesthesia Plan  Type of Anesthesia, risks & benefits discussed:  Anesthesia Type:  CSE  Patient's Preference:   Intra-op Monitoring Plan: standard ASA monitors  Intra-op Monitoring Plan Comments:   Post Op Pain Control Plan: multimodal analgesia, IV/PO Opioids PRN and per primary service following discharge from PACU  Post Op Pain Control Plan Comments:   Induction:   IV  Beta Blocker:  Patient is not currently on a Beta-Blocker (No further documentation required).       Informed Consent: Patient understands risks and agrees with Anesthesia plan.  Questions answered. Anesthesia consent signed with patient.  ASA Score: 2     Day of Surgery Review of History & Physical: I have interviewed and examined the patient. I have reviewed the patient's H&P dated:    H&P update referred to the provider.         Ready For Surgery From Anesthesia Perspective.

## 2018-01-02 NOTE — HPI
Amber Aguilar is a 32 y.o. [id number] at 39w3d presents for scheduled RLTCS and BTL.    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  Patient denies contractions, denies vaginal bleeding, denies LOF.   Fetal Movement: normal.

## 2018-01-02 NOTE — SUBJECTIVE & OBJECTIVE
Obstetric HPI:    Obstetric History       T2      L2     SAB0   TAB0   Ectopic0   Multiple0   Live Births2       # Outcome Date GA Lbr Jorge/2nd Weight Sex Delivery Anes PTL Lv   3 Current            2 Term            1 Term                 No past medical history on file.  Past Surgical History:   Procedure Laterality Date    ADENOIDECTOMY       SECTION      x2    TONSILLECTOMY         PTA Medications   Medication Sig    PRENATAL 25/IRON FUM/FOLIC/DHA (PRENATAL-1 ORAL) Take by mouth.       Review of patient's allergies indicates:  No Known Allergies     Family History     None        Social History Main Topics    Smoking status: Never Smoker    Smokeless tobacco: Never Used    Alcohol use No    Drug use: No    Sexual activity: Yes     Partners: Male     Review of Systems   Constitutional: Negative for fever.   Respiratory: Negative for cough and shortness of breath.    Cardiovascular: Negative for leg swelling.   Gastrointestinal: Negative for abdominal pain (contractions) and vomiting.   Genitourinary: Negative for vaginal bleeding and vaginal discharge.   Neurological: Negative for headaches.   Hematological: Does not bruise/bleed easily.      Objective:     Vital Signs (Most Recent):    Vital Signs (24h Range):           There is no height or weight on file to calculate BMI.    FHT: 130 Cat 1 (reassuring)  TOCO: irregular     Physical Exam:   Constitutional: She is oriented to person, place, and time. She appears well-developed and well-nourished. No distress.    HENT:   Head: Normocephalic and atraumatic.      Cardiovascular: Normal rate and regular rhythm.     Pulmonary/Chest: Effort normal.        Abdominal: Soft. She exhibits no distension. There is no tenderness.   Gravid               Musculoskeletal: Normal range of motion. She exhibits no edema.       Neurological: She is alert and oriented to person, place, and time.    Skin: Skin is warm and dry.    Psychiatric: She has a  normal mood and affect.       Cervix:  Dilation:  def  Presentation: Vertex     Significant Labs:  No results found for: GROUPTRH, HEPBSAG, RUBELLAIGGSC, STREPBCULT, AFP, MPOVCNI0AL    CBC:   Recent Labs  Lab 01/02/18  0605   WBC 8.46   RBC 3.82*   HGB 12.7   HCT 37.6      MCV 98   MCH 33.2*   MCHC 33.8     I have personallly reviewed all pertinent lab results from the last 24 hours.

## 2018-01-02 NOTE — TRANSFER OF CARE
"Anesthesia Transfer of Care Note    Patient: Amber Aguilar    Procedure(s) Performed: Procedure(s) (LRB):  DELIVERY- SECTION (N/A)    Patient location: Labor and Delivery    Anesthesia Type: CSE    Transport from OR: Transported from OR on room air with adequate spontaneous ventilation    Post pain: adequate analgesia    Post assessment: no apparent anesthetic complications    Post vital signs: stable    Level of consciousness: awake, alert and oriented    Nausea/Vomiting: no nausea/vomiting    Complications: none    Transfer of care protocol was followed      Last vitals:   Visit Vitals  /72 (BP Location: Left arm, Patient Position: Sitting)   Pulse 75   Temp 36.6 °C (97.9 °F) (Oral)   Resp 18   Ht 5' 6" (1.676 m)   Wt 76.4 kg (168 lb 6.9 oz)   SpO2 98%   Breastfeeding? Unknown   BMI 27.19 kg/m²     "

## 2018-01-02 NOTE — LACTATION NOTE
LC reviewed pumping for her NICU baby. Gave mother NICU Blue folder for lactation. Showed mother how to work pump, how to keep track of pumpings, how to label nicu breastmilk, how to clean pump parts and bring milk to NICU even if it is only a drop of milk. NICU uses mother's milk for mouth care so even small amounts are ok to bring to NICU. Mother aware to pump 8 or more times a day. Showed mother how to use Symphony pump on premie setting.Mother has her own Pump In Style electric pump.

## 2018-01-02 NOTE — ANESTHESIA PROCEDURE NOTES
CSE    Patient location during procedure: OR  Start time: 1/2/2018 7:03 AM  Timeout: 1/2/2018 7:00 AM  End time: 1/2/2018 7:06 AM  Staffing  Anesthesiologist: NONI FLORES  Resident/CRNA: CARSON BATEMAN  Performed: resident/CRNA   Preanesthetic Checklist  Completed: patient identified, site marked, surgical consent, pre-op evaluation, timeout performed, IV checked, risks and benefits discussed and monitors and equipment checked  CSE  Patient position: sitting  Prep: ChloraPrep  Patient monitoring: heart rate, cardiac monitor, continuous pulse ox and frequent blood pressure checks  Approach: midline  Spinal Needle  Needle type: Emmy   Needle gauge: 25 G  Needle length: 5 in  Epidural Needle  Injection technique: SUNITA air  Needle type: Tuohy   Needle gauge: 18 G  Needle length: 3.5 in  Needle insertion depth: 5 cm  Location: L4-5  Needle localization: anatomical landmarks  Catheter  Catheter type: springwound  Catheter size: 20 G  Catheter at skin depth: 10 cm  Assessment  Sensory level: T5   Dermatomal levels determined by pinch or prick  Intrathecal Medications:  Bolus administered: 1.6 mL of with dextrose bupivacaine  administered: 10 mcg of  fentanyl  Epinephrine added: none  Additional Notes  Plus 150mcg duramorph

## 2018-01-02 NOTE — HOSPITAL COURSE
01/02/2018: Admitted for scheduled RLTCS and BTL. 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 Underwent procedure as scheduled, uncomplicated. See op note for details.  01/03/2018: POD#1. Doing well from post-op standpoint. Pain controlled, tolerating diet, ambulating and voiding independently.   01/04/2018: POD#2. Doing well, no acute issues. Received Rhogam yesterday afternoon.   01/05/2018 POD#3. Doing well, meeting post op milestones. Plan for early discharge this AM as infant has surgery scheduled for 0800.

## 2018-01-02 NOTE — ASSESSMENT & PLAN NOTE
- Consents signed and to chart  - BTL consents signed   - Admit to Labor and Delivery unit  - Epidural per Anesthesia  - Draw CBC, T&S  - Ancef OCTOR  - To OR for C/S. Case Request is in.   - Notify Staff  - Ultrasound performed, infant in vertex position.   - Post-Partum Hemorrhage risk - low

## 2018-01-02 NOTE — L&D DELIVERY NOTE
Operative note to be dictated by Dr. Renu Lopez MD  Ob/Gyn PGY-2      Delivery Information for  Margarita Aguilar    Birth information:  YOB: 2019   Time of birth: 7:26 AM   Sex: female   Head Delivery Date/Time: 2018  7:26 AM   Delivery type: , Low Transverse   Gestational Age: 39w3d    Delivery Providers    Delivering clinician:  Luiz Sears MD   Other personnel:   Provider Role   Keya Lopez MD Resident   Anjali Jenkins RN Registered Nurse   Starla Barone Technician               [place] Measurements    Weight:  3500 g            Assessment    Living status:  Living  Apgars:     1 Minute:   5 Minute:   10 Minute:   15 Minute:   20 Minute:     Skin Color:   1  1       Heart Rate:   2  2       Reflex Irritability:   2  2       Muscle Tone:   2  2       Respiratory Effort:   2  2       Total:   9  9               Apgars Assigned By:  NICU         Assisted Delivery Details:    Forceps attempted?:  No  Vacuum extractor attempted?:  No         Shoulder Dystocia    Shoulder dystocia present?:  No           Presentation and Position    Presentation:   Vertex   Position:   Left    Occiput    Anterior            Interventions/Resuscitation    Method:  NICU Attended       Cord    Vessels:  3 vessels  Complications:  None  Delayed Cord Clamping?:  No  Cord Clamped Date/Time:  2018  7:26 AM  Cord Blood Disposition:  Lab, Sent with Baby  Gases Sent?:  Yes  Stem Cell Collection (by MD):  No       Placenta    Date and time:  2018  7:27 AM  Removal:  Expressed  Appearance:  Intact  Placenta disposition:  discarded           Labor Events:       labor: No     Labor Onset Date/Time:         Dilation Complete Date/Time:         Start Pushing Date/Time:       Rupture Date/Time: 18         Rupture type: artificial rupture of membranes         Fluid Amount: moderate      Fluid Color: clear      Fluid Odor:        Membrane Status (PeriCalm):         Rupture Date/Time (PeriCalm):        Fluid Amount (PeriCalm):        Fluid Color (PeriCalm):         steroids: None     Antibiotics given for GBS: No     Induction: none     Indications for induction:        Augmentation:       Indications for augmentation:       Labor complications: None     Additional complications:          Cervical ripening:                     Delivery:      Episiotomy: None     Indication for Episiotomy:       Perineal Lacerations: None Repaired:      Periurethral Laceration: none Repaired:     Labial Laceration: none Repaired:     Sulcus Laceration: none Repaired:     Vaginal Laceration: No Repaired:     Cervical Laceration: No Repaired:     Repair suture:       Repair # of packets:       Vaginal delivery QBL (mL): 0      QBL (mL): 550     Combined Blood Loss (mL): 550     Vaginal Sweep Performed: No     Surgicount Correct: Yes       Other providers:       Anesthesia    Method:  Spinal, Epidural          Details (if applicable):  Trial of Labor No    Categorization: Repeat    Priority: Routine   Indications for : Repeat Section   Incision Type: low transverse     Additional  information:  Forceps:    Vacuum:    Breech:    Observed anomalies    Other (Comments):         Modified Bilateral Kierra tubal ligation performed

## 2018-01-02 NOTE — ANESTHESIA RELEASE NOTE
"Anesthesia Release from PACU Note    Patient: Amber Aguilar 2  Procedure(s) Performed: Procedure(s) (LRB):  DELIVERY- SECTION (N/A)    Anesthesia type: CSE    Post pain: Adequate analgesia    Post assessment: no apparent anesthetic complications    Last Vitals:   Visit Vitals  /72 (BP Location: Left arm, Patient Position: Sitting)   Pulse 75   Temp 36.6 °C (97.9 °F) (Oral)   Resp 18   Ht 5' 6" (1.676 m)   Wt 76.4 kg (168 lb 6.9 oz)   SpO2 98%   Breastfeeding? Unknown   BMI 27.19 kg/m²       Post vital signs: stable    Level of consciousness: awake, alert  and oriented    Nausea/Vomiting: no nausea/no vomiting    Complications: none    Airway Patency: patent    Respiratory: unassisted, spontaneous ventilation, room air    Cardiovascular: stable and blood pressure at baseline    Hydration: euvolemic  "

## 2018-01-02 NOTE — DISCHARGE INSTRUCTIONS
Breastfeeding discharge instructions given with First Alert form and reviewed.  Also discussed:   AAP recommendation of exclusive breastfeeding for the first 6 months of life and continued breastfeeding with the introduction of supplemental foods beyond the first year of life.  Instructed on the recommendation to delay all bottle and pacifier use until after 4 weeks of age and breastfeeding is well established.  Discussed the benefits of exclusive breastfeeding for both mother and baby.  Discussed the risks of supplementation/pacifier use on the exclusivity of breastfeeding in the first 6 months. Feed the baby at the earliest sign of hunger or comfort  o Hands to mouth, sucking motions  o Rooting or searching for something to suck on  o Dont wait for crying - it is a not a late sign of hunger; it is a sign of distress     The feedings may be 8-12 times per 24hrs and will not follow a schedule   Alternate the breast you start the feeding with, or start with the breast that feels the fullest   Switch breasts when the baby takes himself off the breast or falls asleep   Keep offering breasts until the baby looks full, no longer gives hunger signs, and stays asleep when placed on his back in the crib   If the baby is sleepy and wont wake for a feeding, put the baby skin-to-skin dressed in a diaper against the mothers bare chest   Sleep near your baby   The baby should be positioned and latched on to the breast correctly  o Chest-to-chest, chin in the breast  o Babys lips are flipped outward  o Babys mouth is stretched open wide like a shout  o Babys sucking should feel like tugging to the mother  - The baby should be drinking at the breast:  o You should hear swallowing or gulping throughout the feeding  o You should see milk on the babys lips when he comes off the breast  o Your breasts should be softer when the baby is finished feeding  o The baby should look relaxed at the end of feedings  o After  the 4th day and your milk is in:  o The babys poop should turn bright yellow and be loose, watery, and seedy  o The baby should have at least 3-4 poops the size of the palm of your hand per day  o The baby should have at least 6-8 wet diapers per day  o The urine should be light yellow in color  You should drink when you are thirsty and eat a healthy diet when you are    hungry.     Take naps to get the rest you need.   Take medications and/or drink alcohol only with permission of your obstetrician    or the babys pediatrician.  You can also call the [facility],   ([phone]), Monday-Friday, 8am-5pm Central time, to get the most   up-to-date evidence-based information on the use of medications during   pregnancy and breastfeeding.      The baby should be examined by a pediatrician at 3-5 days of age; unless ordered sooner by the pediatrician.  Once your milk comes in, the baby should be back to birth weight no later than 10-14 days of age.

## 2018-01-03 PROBLEM — O26.899 RH NEGATIVE, MATERNAL: Status: ACTIVE | Noted: 2018-01-03

## 2018-01-03 PROBLEM — Z67.91 RH NEGATIVE, MATERNAL: Status: ACTIVE | Noted: 2018-01-03

## 2018-01-03 LAB
BASOPHILS # BLD AUTO: 0.01 K/UL
BASOPHILS NFR BLD: 0.1 %
DIFFERENTIAL METHOD: ABNORMAL
EOSINOPHIL # BLD AUTO: 0 K/UL
EOSINOPHIL NFR BLD: 0.3 %
ERYTHROCYTE [DISTWIDTH] IN BLOOD BY AUTOMATED COUNT: 13.4 %
HCT VFR BLD AUTO: 30.8 %
HGB BLD-MCNC: 10.3 G/DL
INJECT RH IG VOL PATIENT: NORMAL ML
LYMPHOCYTES # BLD AUTO: 1.4 K/UL
LYMPHOCYTES NFR BLD: 13.8 %
MCH RBC QN AUTO: 33.3 PG
MCHC RBC AUTO-ENTMCNC: 33.4 G/DL
MCV RBC AUTO: 100 FL
MONOCYTES # BLD AUTO: 0.9 K/UL
MONOCYTES NFR BLD: 8.2 %
NEUTROPHILS # BLD AUTO: 8 K/UL
NEUTROPHILS NFR BLD: 77.6 %
PLATELET # BLD AUTO: 163 K/UL
PLATELET BLD QL SMEAR: ABNORMAL
PMV BLD AUTO: 10.4 FL
RBC # BLD AUTO: 3.09 M/UL
RUBV IGG SER-ACNC: 18.8 IU/ML
RUBV IGG SER-IMP: REACTIVE
WBC # BLD AUTO: 10.39 K/UL

## 2018-01-03 PROCEDURE — 63600519 RHOGAM PHARM REV CODE 636 ALT 250 W HCPCS: Performed by: STUDENT IN AN ORGANIZED HEALTH CARE EDUCATION/TRAINING PROGRAM

## 2018-01-03 PROCEDURE — 85025 COMPLETE CBC W/AUTO DIFF WBC: CPT

## 2018-01-03 PROCEDURE — 99231 SBSQ HOSP IP/OBS SF/LOW 25: CPT | Mod: ,,, | Performed by: OBSTETRICS & GYNECOLOGY

## 2018-01-03 PROCEDURE — 25000003 PHARM REV CODE 250: Performed by: STUDENT IN AN ORGANIZED HEALTH CARE EDUCATION/TRAINING PROGRAM

## 2018-01-03 PROCEDURE — 63600175 PHARM REV CODE 636 W HCPCS: Performed by: STUDENT IN AN ORGANIZED HEALTH CARE EDUCATION/TRAINING PROGRAM

## 2018-01-03 PROCEDURE — 11000001 HC ACUTE MED/SURG PRIVATE ROOM

## 2018-01-03 PROCEDURE — 36415 COLL VENOUS BLD VENIPUNCTURE: CPT

## 2018-01-03 RX ORDER — HYDROCODONE BITARTRATE AND ACETAMINOPHEN 5; 325 MG/1; MG/1
1 TABLET ORAL EVERY 4 HOURS PRN
Qty: 25 TABLET | Refills: 0 | Status: SHIPPED | OUTPATIENT
Start: 2018-01-03 | End: 2018-01-05

## 2018-01-03 RX ORDER — IBUPROFEN 600 MG/1
600 TABLET ORAL EVERY 6 HOURS PRN
Status: DISCONTINUED | OUTPATIENT
Start: 2018-01-03 | End: 2018-01-05 | Stop reason: HOSPADM

## 2018-01-03 RX ORDER — IBUPROFEN 600 MG/1
600 TABLET ORAL EVERY 6 HOURS
Qty: 30 TABLET | Refills: 1 | Status: SHIPPED | OUTPATIENT
Start: 2018-01-04

## 2018-01-03 RX ADMIN — MUPIROCIN 1 G: 20 OINTMENT TOPICAL at 08:01

## 2018-01-03 RX ADMIN — DOCUSATE SODIUM 200 MG: 100 CAPSULE, LIQUID FILLED ORAL at 09:01

## 2018-01-03 RX ADMIN — IBUPROFEN 600 MG: 600 TABLET, FILM COATED ORAL at 06:01

## 2018-01-03 RX ADMIN — HUMAN RHO(D) IMMUNE GLOBULIN 300 MCG: 300 INJECTION, SOLUTION INTRAMUSCULAR at 05:01

## 2018-01-03 RX ADMIN — KETOROLAC TROMETHAMINE 15 MG: 30 INJECTION, SOLUTION INTRAMUSCULAR at 05:01

## 2018-01-03 RX ADMIN — HYDROCODONE BITARTRATE AND ACETAMINOPHEN 1 TABLET: 10; 325 TABLET ORAL at 09:01

## 2018-01-03 RX ADMIN — DOCUSATE SODIUM 200 MG: 100 CAPSULE, LIQUID FILLED ORAL at 08:01

## 2018-01-03 RX ADMIN — OXYCODONE HYDROCHLORIDE 10 MG: 5 TABLET ORAL at 05:01

## 2018-01-03 RX ADMIN — SIMETHICONE CHEW TAB 80 MG 80 MG: 80 TABLET ORAL at 11:01

## 2018-01-03 RX ADMIN — HYDROCODONE BITARTRATE AND ACETAMINOPHEN 1 TABLET: 10; 325 TABLET ORAL at 01:01

## 2018-01-03 RX ADMIN — SIMETHICONE CHEW TAB 80 MG 80 MG: 80 TABLET ORAL at 05:01

## 2018-01-03 NOTE — PLAN OF CARE
Problem: Patient Care Overview  Goal: Plan of Care Review  Outcome: Ongoing (interventions implemented as appropriate)  VSS. Uterus firm w/o massage, bleeding continues to improve.Dressing clean, dry, and intact. Pt ambulating independently, voiding spontaneously. Patient encouraged to drink fluid. Patient also previously went to visit infant in NICU, tolerated activity well. Pain managed adequately w/medication. Plan of care reviewed w/pt and spouse. No new concerns expressed at this time.  Will continue to monitor.

## 2018-01-03 NOTE — OP NOTE
Section Operative Note  Procedure Date: 2018    Procedure: Repeat low-transverse  Section via Pfannenstiel skin incision with Modified Kierra bilateral tubal ligation    Indications: Repeat LTCS; fetal chromosome abnormality    Pre-operative Diagnosis: IUP at 39 week 3 day pregnancy    Post-operative Diagnosis: same    Surgeon: STACIE Sears MD      Assistants: TEJINDER Toledo MD     Anesthesia: Spinal anesthesia    Findings: Normal uterus, tubes, and ovaries. Female infant 9,9 Apgars. Ocular anomalies and club foot noted.    Estimated Blood Loss:  550 ml             PreOp CBC:   Lab Results   Component Value Date    WBC 8.46 2018    HGB 12.7 2018    HCT 37.6 2018    MCV 98 2018     2018                     Complications:  None; patient tolerated the procedure well.           Disposition: PACU - hemodynamically stable.           Condition: stable    Procedure Details   The patient was seen in the Holding Room. The risks, benefits, complications, treatment options, and expected outcomes were discussed with the patient.  The patient concurred with the proposed plan, giving informed consent.  The site of surgery properly noted/marked. The patient was taken to Operating Room, identified as Amber Aguialr and the procedure verified as Repeat  Delivery and bilateral tubal ligation. A Time Out was held and the above information confirmed.    After induction of anesthesia, the patient was prepped and draped in the usual sterile manner while placed in a dorsal supine position with a left lateral tilt.  A uhggins catheter was also placed per nursing. Preoperative antibiotics were administered and an allis test was performed yielding adequate anesthesia.  A Pfannenstiel incision was made and carried down through the subcutaneous tissue to the fascia. Fascial incision was made and extended transversely. The fascia was grasped with Kocher clamps and  from  the underlying rectus tissue superiorly and inferiorly. The peritoneum was identified, found to be free of adherent bowl and entered sharply. Peritoneal incision was extended longitudinally. The vesico-uterine peritoneum was identified and bladder blade was inserted. The vesico-uterine peritoneum was incised transversely and the bladder flap was bluntly freed from the lower uterine segment. The bladder blade was reinserted to keep the bladder out of the operative field. A low transverse uterine incision was made with knife and extended with traction. The amniotic sac was ruptured and the infant was noted to be in vertex position. The head was brought to the incision and elevated out of the pelvis. The patient delivered a single viable female infant without difficulty.  Infant weighed 3500 grams with Apgar scores of 9/9 at one and five minutes respectively. After the umbilical cord was clamped and cut cord blood was obtained for evaluation. The placenta was removed intact and appeared normal . The uterus was exteriorized. The uterine outline, tubes and ovaries appeared normal. The uterine incision was closed with running locked sutures of #1 chromic. Hemostasis was observed. Two extra hemostatic figure of eight sutures were placed at the midpoint of the incision. The right Fallopian tube was grasped in the midisthmic portion and elevated. A two cm segment was isolated and doubly ligated with 0 plain gut. The segemnt was excised and noted to be hemostatic. The same procedure was repeated on the opposite side. The uterus was returned to the abdominal cavity. Incision was reinspected and good hemostasis was noted. The abdominal cavity was irrigated to remove clots. The fascia was then reapproximated with running sutures of 0 PDS. The subcutaneous fat and skin was reapproximated with 2-0 Vicryl and 4-0 monocryl, respectively.    Instrument, sponge, and needle counts were correct prior the abdominal closure and at the  conclusion of the case.

## 2018-01-03 NOTE — PROGRESS NOTES
[facility]  Obstetrics  Postpartum Progress Note    Patient Name: Amber Aguilar  MRN: [id number]  Admission Date: 2018  Hospital Length of Stay: 1 days  Attending Physician: Luiz Sears MD  Primary Care Provider: Toni Reid MD    Subjective:     Principal Problem:S/P  section    Hospital course: 2018: Admitted for scheduled RLTCS and BTL. 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 Underwent procedure as scheduled, uncomplicated. See op note for details.  2018: POD#1. Doing well from post-op standpoint. Pain controlled, tolerating diet, ambulating and voiding independently.     Interval History:     She is doing well this morning. She is tolerating a regular diet without nausea or vomiting. She is voiding spontaneously. She is ambulating. She has passed flatus, and has not a BM. Vaginal bleeding is mild. She denies fever or chills. Abdominal pain is moderate and controlled with oral medications. She is breastfeeding (pumping).     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     Objective:     Vital Signs (Most Recent):  Temp: 98 °F (36.7 °C) (18 0405)  Pulse: 81 (18 0405)  Resp: 18 (18 040)  BP: 116/63 (18 0405)  SpO2: 97 % (18 0405) Vital Signs (24h Range):  Temp:  [97.6 °F (36.4 °C)-98 °F (36.7 °C)] 98 °F (36.7 °C)  Pulse:  [56-84] 81  Resp:  [18] 18  SpO2:  [97 %-100 %] 97 %  BP: (105-134)/(56-77) 116/63     Weight: 76.4 kg (168 lb 6.9 oz)  Body mass index is 27.19 kg/m².      Intake/Output Summary (Last 24 hours) at 18 0648  Last data filed at 18 0130   Gross per 24 hour   Intake             1000 ml   Output             3625 ml   Net            -2625 ml       Significant Labs:  Lab Results   Component Value Date    GROUPTRH A NEG 2018    HEPBSAG Negative 2018       Recent Labs  Lab 18  0022   HGB 10.3*   HCT 30.8*       I have  personallly reviewed all pertinent lab results from the last 24 hours.    Physical Exam:   Constitutional: She is oriented to person, place, and time. She appears well-developed and well-nourished. No distress.    HENT:   Head: Normocephalic and atraumatic.      Cardiovascular: Normal rate and regular rhythm.     Pulmonary/Chest: Effort normal.        Abdominal: Soft. Bowel sounds are normal. She exhibits abdominal incision (c/d/i). She exhibits no distension. There is tenderness (appropriate). There is no rebound and no guarding.     Genitourinary:   Genitourinary Comments: Fundus firm, below umbilicus           Musculoskeletal: She exhibits no edema.       Neurological: She is alert and oriented to person, place, and time.    Skin: Skin is warm and dry.    Psychiatric: She has a normal mood and affect.       Assessment/Plan:     32 y.o. female  for:    * S/P  section    Postpartum care:  - Patient doing well. Continue routine management and advances.  - Continue PO pain meds. Pain well controlled.  - Encourage ambulation.   - Heme: Pre Delivery h/h  --> Post Delivery h/h 10/30  - Contraception - s/p BTL  - Lactation - The patient is pumping for infant in NICU. Lactation nurse following along PRN  - Rh Status - neg              Rh negative, maternal    - infant Rh pos  - Shanna screen neg  - administer standard dose Rhogam            Disposition: As patient meets milestones, will plan to discharge POD 3-4.    Felicia Johnson MD  Obstetrics  [facility]

## 2018-01-03 NOTE — SUBJECTIVE & OBJECTIVE
Hospital course: 01/02/2018: Admitted for scheduled RLTCS and BTL. 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 Underwent procedure as scheduled, uncomplicated. See op note for details.  01/03/2018: POD#1. Doing well from post-op standpoint. Pain controlled, tolerating diet, ambulating and voiding independently.     Interval History:     She is doing well this morning. She is tolerating a regular diet without nausea or vomiting. She is voiding spontaneously. She is ambulating. She has passed flatus, and has not a BM. Vaginal bleeding is mild. She denies fever or chills. Abdominal pain is moderate and controlled with oral medications. She is breastfeeding (pumping).     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     Objective:     Vital Signs (Most Recent):  Temp: 98 °F (36.7 °C) (01/03/18 0405)  Pulse: 81 (01/03/18 0405)  Resp: 18 (01/03/18 0405)  BP: 116/63 (01/03/18 0405)  SpO2: 97 % (01/03/18 0405) Vital Signs (24h Range):  Temp:  [97.6 °F (36.4 °C)-98 °F (36.7 °C)] 98 °F (36.7 °C)  Pulse:  [56-84] 81  Resp:  [18] 18  SpO2:  [97 %-100 %] 97 %  BP: (105-134)/(56-77) 116/63     Weight: 76.4 kg (168 lb 6.9 oz)  Body mass index is 27.19 kg/m².      Intake/Output Summary (Last 24 hours) at 01/03/18 0648  Last data filed at 01/03/18 0130   Gross per 24 hour   Intake             1000 ml   Output             3625 ml   Net            -2625 ml       Significant Labs:  Lab Results   Component Value Date    GROUPTRH A NEG 01/02/2018    HEPBSAG Negative 01/02/2018       Recent Labs  Lab 01/03/18  0022   HGB 10.3*   HCT 30.8*       I have personallly reviewed all pertinent lab results from the last 24 hours.    Physical Exam:   Constitutional: She is oriented to person, place, and time. She appears well-developed and well-nourished. No distress.    HENT:   Head: Normocephalic and atraumatic.      Cardiovascular: Normal rate and regular rhythm.      Pulmonary/Chest: Effort normal.        Abdominal: Soft. Bowel sounds are normal. She exhibits abdominal incision (c/d/i). She exhibits no distension. There is tenderness (appropriate). There is no rebound and no guarding.     Genitourinary:   Genitourinary Comments: Fundus firm, below umbilicus           Musculoskeletal: She exhibits no edema.       Neurological: She is alert and oriented to person, place, and time.    Skin: Skin is warm and dry.    Psychiatric: She has a normal mood and affect.

## 2018-01-03 NOTE — ASSESSMENT & PLAN NOTE
Postpartum care:  - Patient doing well. Continue routine management and advances.  - Continue PO pain meds. Pain well controlled.  - Encourage ambulation.   - Heme: Pre Delivery h/h 12/37 --> Post Delivery h/h 10/30  - Contraception - s/p BTL  - Lactation - The patient is pumping for infant in NICU. Lactation nurse following along PRN

## 2018-01-03 NOTE — ANESTHESIA POSTPROCEDURE EVALUATION
"Anesthesia Post Evaluation    Patient: Amber Aguilar    Procedure(s) Performed: Procedure(s) (LRB):  DELIVERY- SECTION (N/A)    Final Anesthesia Type: CSE  Patient location during evaluation: labor & delivery  Patient participation: Yes- Able to Participate  Level of consciousness: awake and alert and oriented  Post-procedure vital signs: reviewed and stable  Pain management: adequate  Airway patency: patent  PONV status at discharge: No PONV  Anesthetic complications: no      Cardiovascular status: blood pressure returned to baseline  Respiratory status: room air, unassisted and spontaneous ventilation  Hydration status: euvolemic  Follow-up not needed.        Visit Vitals  /64 (Patient Position: Sitting)   Pulse 79   Temp 36.5 °C (97.7 °F) (Oral)   Resp 18   Ht 5' 6" (1.676 m)   Wt 76.4 kg (168 lb 6.9 oz)   SpO2 98%   Breastfeeding? Unknown   BMI 27.19 kg/m²       Pain/Renan Score: Pain Rating Prior to Med Admin: 6 (1/3/2018  1:12 PM)  Pain Rating Post Med Admin: 4 (1/3/2018  6:39 AM)      "

## 2018-01-03 NOTE — ASSESSMENT & PLAN NOTE
Postpartum care:  - Patient doing well. Continue routine management and advances.  - Continue PO pain meds. Pain well controlled.  - Encourage ambulation.   - Heme: Pre Delivery h/h 12/37 --> Post Delivery h/h 10/30  - Contraception - per primary OB  - Lactation - The patient is pumping for infant in NICU. Lactation nurse following along PRN  - Rh Status - neg

## 2018-01-04 PROBLEM — O35.10X0: Status: ACTIVE | Noted: 2018-01-04

## 2018-01-04 PROBLEM — O34.219 PREVIOUS CESAREAN, DELIVERED, CURRENT HOSPITALIZATION: Status: ACTIVE | Noted: 2018-01-02

## 2018-01-04 PROCEDURE — 25000003 PHARM REV CODE 250: Performed by: STUDENT IN AN ORGANIZED HEALTH CARE EDUCATION/TRAINING PROGRAM

## 2018-01-04 PROCEDURE — 11000001 HC ACUTE MED/SURG PRIVATE ROOM

## 2018-01-04 PROCEDURE — 99231 SBSQ HOSP IP/OBS SF/LOW 25: CPT | Mod: ,,, | Performed by: OBSTETRICS & GYNECOLOGY

## 2018-01-04 RX ORDER — IBUPROFEN 600 MG/1
600 TABLET ORAL EVERY 6 HOURS PRN
Qty: 30 TABLET | Refills: 3 | Status: SHIPPED | OUTPATIENT
Start: 2018-01-04

## 2018-01-04 RX ADMIN — HYDROCODONE BITARTRATE AND ACETAMINOPHEN 1 TABLET: 5; 325 TABLET ORAL at 02:01

## 2018-01-04 RX ADMIN — IBUPROFEN 600 MG: 600 TABLET, FILM COATED ORAL at 12:01

## 2018-01-04 RX ADMIN — IBUPROFEN 600 MG: 600 TABLET, FILM COATED ORAL at 07:01

## 2018-01-04 RX ADMIN — DOCUSATE SODIUM 200 MG: 100 CAPSULE, LIQUID FILLED ORAL at 02:01

## 2018-01-04 RX ADMIN — SIMETHICONE CHEW TAB 80 MG 80 MG: 80 TABLET ORAL at 07:01

## 2018-01-04 RX ADMIN — SIMETHICONE CHEW TAB 80 MG 80 MG: 80 TABLET ORAL at 03:01

## 2018-01-04 RX ADMIN — DOCUSATE SODIUM 200 MG: 100 CAPSULE, LIQUID FILLED ORAL at 07:01

## 2018-01-04 RX ADMIN — IBUPROFEN 600 MG: 600 TABLET, FILM COATED ORAL at 02:01

## 2018-01-04 RX ADMIN — HYDROCODONE BITARTRATE AND ACETAMINOPHEN 1 TABLET: 5; 325 TABLET ORAL at 03:01

## 2018-01-04 NOTE — LACTATION NOTE
01/03/18 1310   Maternal Infant Feeding   Time Spent (min) 0-15 min   Equipment Type/Education   Pump Type Symphony   Breast Pump Type double electric, hospital grade   Breast Pump Flange Type hard   Pumping Frequency (times) (8 or more daily)   Lactation Referrals   Lactation Consult Follow up   Lactation Interventions   Maternal Breastfeeding Support lactation counseling provided   pt states pumping 8 times daily.pt has no questions. Pt received call from nicu during consultation and needed to go to 5th floor to speak to MD.

## 2018-01-04 NOTE — SUBJECTIVE & OBJECTIVE
Hospital course: 01/02/2018: Admitted for scheduled RLTCS and BTL. 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 Underwent procedure as scheduled, uncomplicated. See op note for details.  01/03/2018: POD#1. Doing well from post-op standpoint. Pain controlled, tolerating diet, ambulating and voiding independently.   01/04/2018: POD#2. Doing well, no acute issues.     Interval History:     She continues to do well this morning. She is tolerating a regular diet without nausea or vomiting. She is voiding spontaneously. She is ambulating. She has passed flatus, and has not a BM. Vaginal bleeding is mild. She denies fever or chills. Abdominal pain is mild and controlled with oral medications. She is breastfeeding (pumping).     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     Objective:     Vital Signs (Most Recent):  Temp: 97.7 °F (36.5 °C) (01/04/18 0025)  Pulse: 87 (01/04/18 0025)  Resp: 18 (01/04/18 0025)  BP: (!) 103/58 (01/04/18 0025)  SpO2: 97 % (01/04/18 0025) Vital Signs (24h Range):  Temp:  [97.7 °F (36.5 °C)-98.3 °F (36.8 °C)] 97.7 °F (36.5 °C)  Pulse:  [79-87] 87  Resp:  [18] 18  SpO2:  [97 %-98 %] 97 %  BP: (103-108)/(58-68) 103/58     Weight: 76.4 kg (168 lb 6.9 oz)  Body mass index is 27.19 kg/m².    No intake or output data in the 24 hours ending 01/04/18 0601    Significant Labs:  Lab Results   Component Value Date    GROUPTRH A NEG 01/02/2018    HEPBSAG Negative 01/02/2018       Recent Labs  Lab 01/03/18  0022   HGB 10.3*   HCT 30.8*       I have personallly reviewed all pertinent lab results from the last 24 hours.    Physical Exam:   Constitutional: She is oriented to person, place, and time. She appears well-developed and well-nourished. No distress.    HENT:   Head: Normocephalic and atraumatic.      Cardiovascular: Normal rate and regular rhythm.     Pulmonary/Chest: Effort normal.        Abdominal: Soft. Bowel sounds are normal. She  exhibits abdominal incision (c/d/i). She exhibits no distension. There is tenderness (minimal, appropriate). There is no rebound and no guarding.     Genitourinary:   Genitourinary Comments: Fundus firm, below umbilicus           Musculoskeletal: Moves all extremeties. She exhibits no edema or tenderness.       Neurological: She is alert and oriented to person, place, and time.    Skin: Skin is warm and dry.    Psychiatric: She has a normal mood and affect.

## 2018-01-05 VITALS
HEART RATE: 87 BPM | DIASTOLIC BLOOD PRESSURE: 63 MMHG | SYSTOLIC BLOOD PRESSURE: 114 MMHG | RESPIRATION RATE: 18 BRPM | BODY MASS INDEX: 27.07 KG/M2 | TEMPERATURE: 98 F | WEIGHT: 168.44 LBS | OXYGEN SATURATION: 97 % | HEIGHT: 66 IN

## 2018-01-05 PROCEDURE — 63600175 PHARM REV CODE 636 W HCPCS: Performed by: STUDENT IN AN ORGANIZED HEALTH CARE EDUCATION/TRAINING PROGRAM

## 2018-01-05 PROCEDURE — 90715 TDAP VACCINE 7 YRS/> IM: CPT | Performed by: STUDENT IN AN ORGANIZED HEALTH CARE EDUCATION/TRAINING PROGRAM

## 2018-01-05 PROCEDURE — 25000003 PHARM REV CODE 250: Performed by: STUDENT IN AN ORGANIZED HEALTH CARE EDUCATION/TRAINING PROGRAM

## 2018-01-05 PROCEDURE — 99238 HOSP IP/OBS DSCHRG MGMT 30/<: CPT | Mod: ,,, | Performed by: OBSTETRICS & GYNECOLOGY

## 2018-01-05 PROCEDURE — 90471 IMMUNIZATION ADMIN: CPT | Performed by: STUDENT IN AN ORGANIZED HEALTH CARE EDUCATION/TRAINING PROGRAM

## 2018-01-05 RX ORDER — HYDROCODONE BITARTRATE AND ACETAMINOPHEN 5; 325 MG/1; MG/1
1 TABLET ORAL EVERY 4 HOURS PRN
Qty: 20 TABLET | Refills: 0 | Status: SHIPPED | OUTPATIENT
Start: 2018-01-05

## 2018-01-05 RX ADMIN — CLOSTRIDIUM TETANI TOXOID ANTIGEN (FORMALDEHYDE INACTIVATED), CORYNEBACTERIUM DIPHTHERIAE TOXOID ANTIGEN (FORMALDEHYDE INACTIVATED), BORDETELLA PERTUSSIS TOXOID ANTIGEN (GLUTARALDEHYDE INACTIVATED), BORDETELLA PERTUSSIS FILAMENTOUS HEMAGGLUTININ ANTIGEN (FORMALDEHYDE INACTIVATED), BORDETELLA PERTUSSIS PERTACTIN ANTIGEN, AND BORDETELLA PERTUSSIS FIMBRIAE 2/3 ANTIGEN 0.5 ML: 5; 2; 2.5; 5; 3; 5 INJECTION, SUSPENSION INTRAMUSCULAR at 06:01

## 2018-01-05 RX ADMIN — IBUPROFEN 600 MG: 600 TABLET, FILM COATED ORAL at 01:01

## 2018-01-05 RX ADMIN — HYDROCODONE BITARTRATE AND ACETAMINOPHEN 1 TABLET: 10; 325 TABLET ORAL at 06:01

## 2018-01-05 NOTE — SUBJECTIVE & OBJECTIVE
Hospital course: 01/02/2018: Admitted for scheduled RLTCS and BTL. 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 Underwent procedure as scheduled, uncomplicated. See op note for details.  01/03/2018: POD#1. Doing well from post-op standpoint. Pain controlled, tolerating diet, ambulating and voiding independently.   01/04/2018: POD#2. Doing well, no acute issues. Received Rhogam yesterday afternoon.   01/05/2018 POD#3. Doing well, meeting post op milestones. Plan for early discharge this AM as infant has surgery scheduled for 0800.     Interval History:     She continues to do well this morning. She is tolerating a regular diet without nausea or vomiting. She is voiding spontaneously. She is ambulating. She has passed flatus, and has not a BM. Vaginal bleeding is mild. She denies fever or chills. Abdominal pain is mild and controlled with oral medications. She is breastfeeding (pumping).     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 Desires early discharge this AM as infant has surgery scheduled for 0800 at different hospital.     Objective:     Vital Signs (Most Recent):  Temp: 98 °F (36.7 °C) (01/05/18 0005)  Pulse: 87 (01/05/18 0005)  Resp: 18 (01/05/18 0005)  BP: 114/63 (01/05/18 0005)  SpO2: 97 % (01/05/18 0005) Vital Signs (24h Range):  Temp:  [97.8 °F (36.6 °C)-98 °F (36.7 °C)] 98 °F (36.7 °C)  Pulse:  [79-90] 87  Resp:  [18] 18  SpO2:  [97 %-98 %] 97 %  BP: (114-131)/(63-81) 114/63     Weight: 76.4 kg (168 lb 6.9 oz)  Body mass index is 27.19 kg/m².    No intake or output data in the 24 hours ending 01/05/18 0416    Significant Labs:  Lab Results   Component Value Date    GROUPTRH A NEG 01/02/2018    HEPBSAG Negative 01/02/2018     No results for input(s): HGB, HCT in the last 48 hours.    I have personallly reviewed all pertinent lab results from the last 24 hours.    Physical Exam:   Constitutional: She is oriented to person, place, and  time. She appears well-developed and well-nourished. No distress.    HENT:   Head: Normocephalic and atraumatic.      Cardiovascular: Normal rate and regular rhythm.     Pulmonary/Chest: Effort normal.        Abdominal: Soft. Bowel sounds are normal. She exhibits abdominal incision (c/d/i). She exhibits no distension. There is tenderness (minimal, appropriate). There is no rebound and no guarding.     Genitourinary:   Genitourinary Comments: Fundus firm, below umbilicus           Musculoskeletal: Moves all extremeties. She exhibits no edema or tenderness.       Neurological: She is alert and oriented to person, place, and time.    Skin: Skin is warm and dry.    Psychiatric: She has a normal mood and affect.

## 2018-01-05 NOTE — NURSING
Spoke with Ye in Pharmacy - at night patients are not given take home prescriptions - they do not have the capability to do so.  Dr. Sims made aware and gave paper Rx to patient.

## 2018-01-05 NOTE — DISCHARGE SUMMARY
[facility]  Obstetrics  Discharge Summary      Patient Name: Amber Aguilar  MRN: [id number]  Admission Date: 2018  Hospital Length of Stay: 3 days  Discharge Date and Time:  2018 4:15 AM  Attending Physician: Luiz Sears MD   Discharging Provider: Marisela Melgar MD  Primary Care Provider: Toni Reid MD    HPI: Amber Aguilar is a 32 y.o. [id number] at 39w3d presents for scheduled RLTCS and BTL.    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  Patient denies contractions, denies vaginal bleeding, denies LOF.   Fetal Movement: normal.       Procedure(s) (LRB):  DELIVERY- SECTION (N/A)     Hospital Course:   2018: Admitted for scheduled RLTCS and BTL. NICU to attend delivery /2 to fetal dandy walker syndrome. Underwent procedure as scheduled, uncomplicated. See op note for details.  2018: POD#1. Doing well from post-op standpoint. Pain controlled, tolerating diet, ambulating and voiding independently.   2018: POD#2. Doing well, no acute issues. Received Rhogam yesterday afternoon.   2018 POD#3. Doing well, meeting post op milestones. Plan for early discharge this AM as infant has surgery scheduled for 0800.     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    Feeding Method: breast    Immunizations     Date Immunization Status Dose Route/Site Given by    18 MMR Incomplete 0.5 mL Subcutaneous/Left deltoid     18 Tdap Incomplete 0.5 mL Intramuscular/Left deltoid     18 1752 Rho (D) Immune Globulin - IM Given 300 mcg Intramuscular/ Cece Pizarro RN          Delivery:    Episiotomy: None   Lacerations: None   Repair suture:     Repair # of packets:     Blood loss (ml): 0     Birth information:  YOB: 2019   Time of birth: 7:26 AM   Sex: female   Delivery type: , Low Transverse   Gestational Age: 39w3d    Delivery Clinician:      Other providers:       Additional  information:  Forceps:    Vacuum:    Breech:    Observed anomalies      Living?:           APGARS  One minute Five minutes Ten minutes   Skin color:         Heart rate:         Grimace:         Muscle tone:         Breathing:         Totals: 9  9        Placenta: Delivered:       appearance    Pending Diagnostic Studies:     None          Discharged Condition: good    Disposition: Home or Self Care    Follow Up:  Follow-up Information     Primary OBGYN In 6 weeks.    Why:  post op visit               Patient Instructions:     Activity as tolerated     Notify your health care provider if you experience any of the following:  temperature >100.4     Notify your health care provider if you experience any of the following:  persistent nausea and vomiting or diarrhea     Notify your health care provider if you experience any of the following:  severe uncontrolled pain     Notify your health care provider if you experience any of the following:  redness, tenderness, or signs of infection (pain, swelling, redness, odor or green/yellow discharge around incision site)     Notify your health care provider if you experience any of the following:  difficulty breathing or increased cough      Notify your health care provider if you experience any of the following:  severe persistent headache     Notify your health care provider if you experience any of the following:  persistent dizziness, light-headedness, or visual disturbances     Notify your health care provider if you experience any of the following:  increased confusion or weakness     Notify your health care provider if you experience any of the following:   Order Comments: Bleeding through 2 pads/hr for 2 hours       Medications:  Current Discharge Medication List      START taking these medications    Details   hydrocodone-acetaminophen 5-325mg (NORCO) 5-325 mg per tablet Take 1 tablet by mouth every 4 (four) hours as needed.  Qty: 25 tablet, Refills: 0      !! ibuprofen (ADVIL,MOTRIN) 600 MG tablet Take 1 tablet (600 mg total) by mouth every 6 (six) hours.  Qty: 30 tablet, Refills: 1            CONTINUE these medications which have NOT CHANGED    Details   PRENATAL 25/IRON FUM/FOLIC/DHA (PRENATAL-1 ORAL) Take by mouth.             Marisela Sims MD  Obstetrics  [facility]    I have reviewed the notes, assessments, and documentation of Dr. Sims and valdezur.    Luiz Sears Jr., MD  Maternal Fetal Medicine

## 2018-01-05 NOTE — PLAN OF CARE
Problem: Patient Care Overview  Goal: Plan of Care Review  Outcome: Ongoing (interventions implemented as appropriate)  VSS. Uterus firm w/o massage, bleeding continues to improve. Incision w/stitches, steri strips x 3, clean, dry, and intact. Pt ambulating independently, voiding spontaneously, passing flatus. Pain managed adequately w/medication. Plan of care reviewed w/pt and spouse. No new concerns expressed at this time. D/C teaching completed, booklet for mother reviewed cover to cover, demonstration provided for hand expression, no additional questions at present time. Informed patient and spouse that I spoke with Dr. Sims regarding early morning discharge and that doctor would be rounding at 0430, also when I Spoke with Dr. Sims I did mention take home medications/prescriptions. Patient and spouse agreeable with plan of care for the evening and must be discharged early to be with infant for surgical consents for 0800 at different location. Will continue to monitor.

## 2018-01-05 NOTE — NURSING
Tdap given, patient medicated prior to dishcarge teaching. Discharge teaching completed, patient has prescriptions from Dr. Sims. Reviewed discharge instructions and AVS given to patien, significant other present. Paperwork given, patient provided with a Mother-baby care guide and reference and education given on the depression screening tool. All questions answered, no concerns at present time.

## 2018-01-05 NOTE — PROGRESS NOTES
[facility]  Obstetrics  Postpartum Progress Note    Patient Name: Amber Aguilar  MRN: [id number]  Admission Date: 2018  Hospital Length of Stay: 3 days  Attending Physician: Luiz Sears MD  Primary Care Provider: Toni Reid MD    Subjective:     Principal Problem:Previous , delivered, current hospitalization    Hospital course: 2018: Admitted for scheduled RLTCS and BTL. 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 Underwent procedure as scheduled, uncomplicated. See op note for details.  2018: POD#1. Doing well from post-op standpoint. Pain controlled, tolerating diet, ambulating and voiding independently.   2018: POD#2. Doing well, no acute issues. Received Rhogam yesterday afternoon.   2018 POD#3. Doing well, meeting post op milestones. Plan for early discharge this AM as infant has surgery scheduled for 0800.     Interval History:     She continues to do well this morning. She is tolerating a regular diet without nausea or vomiting. She is voiding spontaneously. She is ambulating. She has passed flatus, and has not a BM. Vaginal bleeding is mild. She denies fever or chills. Abdominal pain is mild and controlled with oral medications. She is breastfeeding (pumping).     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 Desires early discharge this AM as infant has surgery scheduled for 0800 at different hospital.     Objective:     Vital Signs (Most Recent):  Temp: 98 °F (36.7 °C) (18 0005)  Pulse: 87 (18 0005)  Resp: 18 (18 0005)  BP: 114/63 (18 0005)  SpO2: 97 % (18 0005) Vital Signs (24h Range):  Temp:  [97.8 °F (36.6 °C)-98 °F (36.7 °C)] 98 °F (36.7 °C)  Pulse:  [79-90] 87  Resp:  [18] 18  SpO2:  [97 %-98 %] 97 %  BP: (114-131)/(63-81) 114/63     Weight: 76.4 kg (168 lb 6.9 oz)  Body mass index is 27.19 kg/m².    No intake or output data in the 24  hours ending 18 0416    Significant Labs:  Lab Results   Component Value Date    GROUPTRH A NEG 2018    HEPBSAG Negative 2018     No results for input(s): HGB, HCT in the last 48 hours.    I have personallly reviewed all pertinent lab results from the last 24 hours.    Physical Exam:   Constitutional: She is oriented to person, place, and time. She appears well-developed and well-nourished. No distress.    HENT:   Head: Normocephalic and atraumatic.      Cardiovascular: Normal rate and regular rhythm.     Pulmonary/Chest: Effort normal.        Abdominal: Soft. Bowel sounds are normal. She exhibits abdominal incision (c/d/i). She exhibits no distension. There is tenderness (minimal, appropriate). There is no rebound and no guarding.     Genitourinary:   Genitourinary Comments: Fundus firm, below umbilicus           Musculoskeletal: Moves all extremeties. She exhibits no edema or tenderness.       Neurological: She is alert and oriented to person, place, and time.    Skin: Skin is warm and dry.    Psychiatric: She has a normal mood and affect.       Assessment/Plan:     32 y.o. female  for:    * Previous , delivered, current hospitalization    Postpartum care:  - Patient doing well. Continue routine management and advances.  - Continue PO pain meds. Pain well controlled.  - Encourage ambulation.   - Heme: Pre Delivery h/h  --> Post Delivery h/h 10/30  - Contraception - s/p BTL  - Lactation - The patient is pumping for infant in NICU. 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 Infant in NICU.            Disposition: As patient meets milestones, will plan to discharge today.    Marisela Sims MD  Obstetrics  [facility]

## (undated) DEVICE — PAD ABD 8X10 STERILE